# Patient Record
Sex: MALE | Race: BLACK OR AFRICAN AMERICAN | NOT HISPANIC OR LATINO | Employment: FULL TIME | URBAN - METROPOLITAN AREA
[De-identification: names, ages, dates, MRNs, and addresses within clinical notes are randomized per-mention and may not be internally consistent; named-entity substitution may affect disease eponyms.]

---

## 2024-04-15 ENCOUNTER — OFFICE VISIT (OUTPATIENT)
Dept: PRIMARY CARE | Facility: CLINIC | Age: 44
End: 2024-04-15
Payer: COMMERCIAL

## 2024-04-15 ENCOUNTER — TELEPHONE (OUTPATIENT)
Dept: PRIMARY CARE | Facility: CLINIC | Age: 44
End: 2024-04-15

## 2024-04-15 VITALS
HEIGHT: 66 IN | BODY MASS INDEX: 30.62 KG/M2 | SYSTOLIC BLOOD PRESSURE: 130 MMHG | DIASTOLIC BLOOD PRESSURE: 72 MMHG | HEART RATE: 76 BPM | WEIGHT: 190.5 LBS

## 2024-04-15 DIAGNOSIS — Z00.00 ANNUAL PHYSICAL EXAM: Primary | ICD-10-CM

## 2024-04-15 DIAGNOSIS — R03.0 ELEVATED BLOOD PRESSURE READING: ICD-10-CM

## 2024-04-15 DIAGNOSIS — F41.8 SITUATIONAL ANXIETY: ICD-10-CM

## 2024-04-15 PROCEDURE — 99386 PREV VISIT NEW AGE 40-64: CPT | Performed by: INTERNAL MEDICINE

## 2024-04-15 PROCEDURE — 1036F TOBACCO NON-USER: CPT | Performed by: INTERNAL MEDICINE

## 2024-04-15 RX ORDER — PROPRANOLOL HYDROCHLORIDE 10 MG/1
10 TABLET ORAL DAILY PRN
Qty: 30 TABLET | Refills: 1 | Status: SHIPPED | OUTPATIENT
Start: 2024-04-15 | End: 2025-04-15

## 2024-04-15 ASSESSMENT — ENCOUNTER SYMPTOMS
SLEEP DISTURBANCE: 0
DIZZINESS: 0
FATIGUE: 0
CONSTIPATION: 0
SHORTNESS OF BREATH: 0
DYSPHORIC MOOD: 0
HEADACHES: 0
LIGHT-HEADEDNESS: 0

## 2024-04-15 NOTE — PROGRESS NOTES
"Subjective   Patient ID: Patience Goncalves is a 44 y.o. male who presents for Establish Care.    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 Here for a physical required by school as he starts his 4th year pre-clinicals.    Has no significant medical problems. Has never had surgery. Doesn't smoke. Will drink a beer around once a week. Has 1 cup of coffee a day and sleeps around 6 hours at night.        Review of Systems   Constitutional:  Negative for fatigue.   Respiratory:  Negative for shortness of breath.    Cardiovascular:  Negative for chest pain.   Gastrointestinal:  Negative for constipation.   Neurological:  Negative for dizziness, light-headedness and headaches.   Psychiatric/Behavioral:  Negative for dysphoric mood and sleep disturbance.        /72   Pulse 76   Ht 1.682 m (5' 6.24\")   Wt 86.4 kg (190 lb 8 oz)   BMI 30.53 kg/m²   Objective   Physical Exam  Constitutional:       General: He is not in acute distress.     Appearance: He is obese. He is not ill-appearing, toxic-appearing or diaphoretic.   HENT:      Head: Normocephalic and atraumatic.   Eyes:      General: No scleral icterus.     Conjunctiva/sclera: Conjunctivae normal.   Cardiovascular:      Rate and Rhythm: Normal rate and regular rhythm.      Heart sounds: No murmur heard.     No friction rub. No gallop.   Pulmonary:      Effort: Pulmonary effort is normal. No respiratory distress.      Breath sounds: No stridor. No wheezing, rhonchi or rales.   Abdominal:      General: Abdomen is flat. Bowel sounds are normal. There is no distension.      Palpations: Abdomen is soft.      Tenderness: There is no abdominal tenderness. There is no guarding.   Musculoskeletal:      Cervical back: Normal range of motion and neck supple. No tenderness.      Right lower leg: No edema.      Left lower leg: No edema.   Lymphadenopathy:      Cervical: No cervical adenopathy.   Skin:     General: Skin is warm and dry.   Neurological:      Mental Status: He " is alert.         Assessment/Plan   Problem List Items Addressed This Visit    None  Visit Diagnoses         Codes    Annual physical exam    -  Primary Z00.00    Relevant Orders    Comprehensive metabolic panel    CBC    Hemoglobin A1c    Lipid panel    Tsh With Reflex To Free T4 If Abnormal    Situational anxiety     F41.8    Relevant Medications    propranolol (Inderal) 10 mg tablet        -BP elevated on first check; pt had run into the office so likely from that. Repeat check improved. Despite that, pt cautioned to monitor BP in case it goes up again in the future.  -Gets situational anxiety, particularly before tests or before operating. Feels like its from inexperience. Is open to trying something for it, so propranolol prescribed. Reviewed how to use it and possible side effects.  -Letter signed stating pt cleared medically for school.  -Pt w/o recent labwork. To get what is ordered above. Aware he must be fasting.         Slava Joshua MD 04/15/24 2:17 PM

## 2024-04-15 NOTE — TELEPHONE ENCOUNTER
Patience also needs a note for a N95 mask fitting because, he has a beard, and can not shave it off he will get a rash. So they asked him to get a note from you stating unable to shave off beard.

## 2024-04-15 NOTE — LETTER
April 15, 2024         Patient: Patience Goncalves   YOB: 1980   Date of Visit: 4/15/2024       To Whom It May Concern:    Patience Goncalves was evaluated in the office today. 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 If there are any questions or concerns, please reach out to our office.      Sincerely,    Slava Joshua MD

## 2024-04-15 NOTE — LETTER
April 22, 2024    Patience Goncalves  [address] [facility] [id number]    To whom it may concern:    Mr. Goncalves reportedly gets a rash when shaving. It is advised that he abstain from shaving his beard.    If you have any questions or concerns, please don't hesitate to call.    Sincerely,           Dr. Slava Joshua MD

## 2024-07-15 ENCOUNTER — APPOINTMENT (OUTPATIENT)
Dept: PRIMARY CARE | Facility: CLINIC | Age: 44
End: 2024-07-15
Payer: COMMERCIAL

## 2024-11-25 DIAGNOSIS — F41.8 SITUATIONAL ANXIETY: ICD-10-CM

## 2024-11-25 RX ORDER — PROPRANOLOL HYDROCHLORIDE 10 MG/1
10 TABLET ORAL DAILY PRN
Qty: 30 TABLET | Refills: 1 | Status: SHIPPED | OUTPATIENT
Start: 2024-11-25 | End: 2025-11-25

## 2025-07-30 DIAGNOSIS — F41.8 SITUATIONAL ANXIETY: ICD-10-CM

## 2025-07-30 NOTE — TELEPHONE ENCOUNTER
Patient called for refill- has not been seen since last year and also no showed last visit. Please advise

## 2025-07-31 RX ORDER — PROPRANOLOL HYDROCHLORIDE 10 MG/1
10 TABLET ORAL DAILY PRN
Qty: 30 TABLET | Refills: 1 | OUTPATIENT
Start: 2025-07-31 | End: 2026-07-31